# Patient Record
Sex: FEMALE | Race: WHITE | Employment: UNEMPLOYED | URBAN - METROPOLITAN AREA
[De-identification: names, ages, dates, MRNs, and addresses within clinical notes are randomized per-mention and may not be internally consistent; named-entity substitution may affect disease eponyms.]

---

## 2017-02-06 ENCOUNTER — HOSPITAL ENCOUNTER (OUTPATIENT)
Dept: ULTRASOUND IMAGING | Age: 27
Discharge: HOME | End: 2017-02-06
Attending: PHYSICIAN ASSISTANT
Payer: SELF-PAY

## 2017-02-06 ENCOUNTER — APPOINTMENT (OUTPATIENT)
Dept: ULTRASOUND IMAGING | Age: 27
End: 2017-02-06
Attending: PHYSICIAN ASSISTANT
Payer: SELF-PAY

## 2017-02-06 DIAGNOSIS — R31.9 HEMATURIA: ICD-10-CM

## 2017-02-06 DIAGNOSIS — R07.89 RIGHT-SIDED CHEST WALL PAIN: ICD-10-CM

## 2017-02-06 DIAGNOSIS — R10.9 RIGHT FLANK PAIN: ICD-10-CM

## 2017-02-06 DIAGNOSIS — Z98.890 RECENT MAJOR SURGERY: ICD-10-CM

## 2017-02-06 DIAGNOSIS — Z87.442 PERSONAL HISTORY OF KIDNEY STONES: ICD-10-CM

## 2017-02-06 PROCEDURE — 76770 US EXAM ABDO BACK WALL COMP: CPT

## 2017-02-06 PROCEDURE — 76705 ECHO EXAM OF ABDOMEN: CPT

## 2017-02-06 NOTE — PROGRESS NOTES
I called and notified the patient of the following imaging results- She has no pain on the left side, so the kidney stone on the left side does not appear to be causing any problems or moving at this time. Impression        IMPRESSION:  1. Suspect small nonobstructing stone left kidney lower pole.   2. No abnormal fluid collection identified in the soft tissues.

## 2017-02-06 NOTE — PROGRESS NOTES
I called and notified the patient of the following imaging results-  Impression     IMPRESSION: Mild scarring and subcutaneous edema. No focal fluid collection.